# Patient Record
Sex: FEMALE | Race: WHITE | NOT HISPANIC OR LATINO | Employment: UNEMPLOYED | URBAN - NONMETROPOLITAN AREA
[De-identification: names, ages, dates, MRNs, and addresses within clinical notes are randomized per-mention and may not be internally consistent; named-entity substitution may affect disease eponyms.]

---
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
## 2024-07-28 ENCOUNTER — OFFICE VISIT (OUTPATIENT)
Dept: FAMILY MEDICINE | Facility: OTHER | Age: 19
End: 2024-07-28
Payer: COMMERCIAL

## 2024-07-28 VITALS
HEIGHT: 65 IN | TEMPERATURE: 98 F | OXYGEN SATURATION: 98 % | SYSTOLIC BLOOD PRESSURE: 88 MMHG | BODY MASS INDEX: 21.33 KG/M2 | HEART RATE: 67 BPM | WEIGHT: 128 LBS | DIASTOLIC BLOOD PRESSURE: 52 MMHG | RESPIRATION RATE: 20 BRPM

## 2024-07-28 DIAGNOSIS — H93.8X2 SENSATION OF FULLNESS IN LEFT EAR: ICD-10-CM

## 2024-07-28 DIAGNOSIS — H92.02 OTALGIA OF LEFT EAR: ICD-10-CM

## 2024-07-28 DIAGNOSIS — H65.192 ACUTE EFFUSION OF LEFT EAR: Primary | ICD-10-CM

## 2024-07-28 PROCEDURE — 99213 OFFICE O/P EST LOW 20 MIN: CPT

## 2024-07-28 PROCEDURE — 250N000009 HC RX 250: Mod: JW

## 2024-07-28 RX ORDER — DEXAMETHASONE SODIUM PHOSPHATE 4 MG/ML
10 VIAL (ML) INJECTION ONCE
Status: COMPLETED | OUTPATIENT
Start: 2024-07-28 | End: 2024-07-28

## 2024-07-28 RX ORDER — ISOTRETINOIN 30 MG/1
CAPSULE ORAL
COMMUNITY

## 2024-07-28 RX ADMIN — DEXAMETHASONE SODIUM PHOSPHATE 10 MG: 4 INJECTION, SOLUTION INTRAMUSCULAR; INTRAVENOUS at 17:21

## 2024-07-28 ASSESSMENT — PAIN SCALES - GENERAL: PAINLEVEL: NO PAIN (0)

## 2024-07-28 NOTE — NURSING NOTE
"Pt presents to  for L ear problems. Was in the water 3 days ago and has had discomfort since.     Chief Complaint   Patient presents with    Ear Problem       FOOD SECURITY SCREENING QUESTIONS  Hunger Vital Signs:  Within the past 12 months we worried whether our food would run out before we got money to buy more. Never  Within the past 12 months the food we bought just didn't last and we didn't have money to get more. Never  Jocelyn Dearmon 7/28/2024 4:46 PM      Initial BP (!) 88/52 (BP Location: Left arm, Patient Position: Sitting, Cuff Size: Adult Regular)   Pulse 67   Temp 98  F (36.7  C) (Temporal)   Resp 20   Ht 1.651 m (5' 5\")   Wt 58.1 kg (128 lb)   SpO2 98%   BMI 21.30 kg/m   Estimated body mass index is 21.3 kg/m  as calculated from the following:    Height as of this encounter: 1.651 m (5' 5\").    Weight as of this encounter: 58.1 kg (128 lb).  Medication Reconciliation: complete    Jocelyncruz Stanley    "

## 2024-07-28 NOTE — PROGRESS NOTES
"ASSESSMENT/PLAN:    I have reviewed the nursing notes.  I have reviewed the findings, diagnosis, plan and need for follow up with the patient.    1. Otalgia of left ear  2. Sensation of fullness in left ear  3. Acute effusion of left ear    - dexAMETHasone (DECADRON) injectable solution used ORALLY 10 mg- received in clinic    - Please read the attached information on serous otitis media for at home care treatment as discussed in the clinic this evening.    - May use over-the-counter Tylenol or ibuprofen as needed for pain, inflammation or fever    - Discussed warning signs/symptoms indicative of need to f/u    - Follow up if symptoms persist or worsen or concerns    - I explained my diagnostic considerations and recommendations to the patient, who voiced understanding and agreement with the treatment plan. All questions were answered. We discussed potential side effects of any prescribed or recommended therapies, as well as expectations for response to treatments.    Anabelle Sheth, DAX CNP  7/28/2024  3:35 PM    HPI:    Humble Thrasher is a 19 year old female who presents to [facility] today for concerns of ear infection of left ear for the past two days.  States that she feels like there is a butterfly in her ear, sensation of full feeling.  States she has used a heating pad for comfort which was minimally effective.  Denies fever, shortness of breath, chest discomfort, cough, congestion, rhinorrhea, headache, dizziness, lightheadedness, nausea, vomiting, diarrhea or rash.    Past Medical History:   Diagnosis Date    Cystitis without hematuria     No Comments Provided    Foreign body (FB) in soft tissue 7/10/2013    Otitis media     Bilateral otitis media treated with Augmentin ES    Otitis media     History of frequent otitis media.    Personal history of other diseases of the female genital tract     Child born vaginally, 40 weeks gestation.  Birth weight 6 lb. 12 oz., 20\" long.  Pregnancy uncomplicated.  " "Child exclusively breast fed.     Past Surgical History:   Procedure Laterality Date    OTHER SURGICAL HISTORY      7/10/2013,10627.0,[place] REMOVE FOOT FOREIGN BODY SUBQ     Social History     Tobacco Use    Smoking status: Never    Smokeless tobacco: Never   Substance Use Topics    Alcohol use: Never     Current Outpatient Medications   Medication Sig Dispense Refill    ISOtretinoin (ABSORICA) 30 MG capsule PLEASE SEE ATTACHED FOR DETAILED DIRECTIONS      doxycycline monohydrate (MONODOX) 100 MG capsule Take 100 mg by mouth  (Patient not taking: Reported on 7/28/2024)      norgestimate-ethinyl estradiol (ORTHO-CYCLEN/SPRINTEC) 0.25-35 MG-MCG tablet Take 1 tablet by mouth (Patient not taking: Reported on 7/28/2024)      tretinoin (RETIN-A) 0.1 % external cream  (Patient not taking: Reported on 7/28/2024)       Allergies   Allergen Reactions    Sulfa Antibiotics Hives     Past medical history, past surgical history, current medications and allergies reviewed and accurate to the best of my knowledge.      ROS:  Refer to HPI    BP (!) 88/52 (BP Location: Left arm, Patient Position: Sitting, Cuff Size: Adult Regular)   Pulse 67   Temp 98  F (36.7  C) (Temporal)   Resp 20   Ht 1.651 m (5' 5\")   Wt 58.1 kg (128 lb)   SpO2 98%   BMI 21.30 kg/m      EXAM:  General Appearance: Well appearing 19 year old female, appropriate appearance for age. No acute distress   Ears: Left TM intact, translucent with bony landmarks appreciated, mild erythema, no + effusion, no bulging, no purulence.  Right TM intact, translucent with bony landmarks appreciated, no erythema, no effusion, no bulging, no purulence.  Left auditory canal clear.  Right auditory canal clear.  Normal external ears, non tender.  Eyes: conjunctivae normal without erythema or irritation, corneas clear, no drainage or crusting, no eyelid swelling, pupils equal   Oropharynx: moist mucous membranes, posterior pharynx without erythema, tonsils symmetric, no erythema, " no exudates or petechiae, no post nasal drip seen, no trismus, voice clear.    Sinuses:  No sinus tenderness upon palpation of the frontal or maxillary sinuses  Nose:  Bilateral nares: no erythema, no edema, no drainage or congestion   Neck: supple without adenopathy  Respiratory: normal chest wall and respirations.  Normal effort.  Clear to auscultation bilaterally, no wheezing, crackles or rhonchi.  No increased work of breathing.  No cough appreciated.  Cardiac: RRR with no murmurs  Musculoskeletal:  Equal movement of bilateral upper extremities.  Equal movement of bilateral lower extremities.  Normal gait.    Neuro: Alert and oriented to person, place, and time.    Psychological: normal affect, alert, oriented, and pleasant.

## 2024-09-15 ENCOUNTER — HEALTH MAINTENANCE LETTER (OUTPATIENT)
Age: 19
End: 2024-09-15

## (undated) RX ORDER — DEXAMETHASONE SODIUM PHOSPHATE 4 MG/ML
INJECTION, SOLUTION INTRA-ARTICULAR; INTRALESIONAL; INTRAMUSCULAR; INTRAVENOUS; SOFT TISSUE
Status: DISPENSED
Start: 2024-07-28